# Patient Record
Sex: FEMALE | Race: WHITE | NOT HISPANIC OR LATINO
[De-identification: names, ages, dates, MRNs, and addresses within clinical notes are randomized per-mention and may not be internally consistent; named-entity substitution may affect disease eponyms.]

---

## 2019-02-19 PROBLEM — Z00.00 ENCOUNTER FOR PREVENTIVE HEALTH EXAMINATION: Status: ACTIVE | Noted: 2019-02-19

## 2019-02-25 ENCOUNTER — APPOINTMENT (OUTPATIENT)
Dept: GYNECOLOGIC ONCOLOGY | Facility: CLINIC | Age: 59
End: 2019-02-25

## 2019-04-05 ENCOUNTER — APPOINTMENT (OUTPATIENT)
Dept: GYNECOLOGIC ONCOLOGY | Facility: CLINIC | Age: 59
End: 2019-04-05
Payer: COMMERCIAL

## 2019-04-05 VITALS
DIASTOLIC BLOOD PRESSURE: 93 MMHG | BODY MASS INDEX: 22.58 KG/M2 | HEIGHT: 60 IN | WEIGHT: 115 LBS | HEART RATE: 104 BPM | SYSTOLIC BLOOD PRESSURE: 159 MMHG | TEMPERATURE: 99.3 F | OXYGEN SATURATION: 98 %

## 2019-04-05 DIAGNOSIS — F17.200 NICOTINE DEPENDENCE, UNSPECIFIED, UNCOMPLICATED: ICD-10-CM

## 2019-04-05 DIAGNOSIS — G45.9 TRANSIENT CEREBRAL ISCHEMIC ATTACK, UNSPECIFIED: ICD-10-CM

## 2019-04-05 DIAGNOSIS — Z80.3 FAMILY HISTORY OF MALIGNANT NEOPLASM OF BREAST: ICD-10-CM

## 2019-04-05 DIAGNOSIS — Z78.9 OTHER SPECIFIED HEALTH STATUS: ICD-10-CM

## 2019-04-05 DIAGNOSIS — Z86.39 PERSONAL HISTORY OF OTHER ENDOCRINE, NUTRITIONAL AND METABOLIC DISEASE: ICD-10-CM

## 2019-04-05 DIAGNOSIS — Z82.49 FAMILY HISTORY OF ISCHEMIC HEART DISEASE AND OTHER DISEASES OF THE CIRCULATORY SYSTEM: ICD-10-CM

## 2019-04-05 PROCEDURE — 76857 US EXAM PELVIC LIMITED: CPT | Mod: 59

## 2019-04-05 PROCEDURE — 76830 TRANSVAGINAL US NON-OB: CPT | Mod: 59

## 2019-04-05 PROCEDURE — 99245 OFF/OP CONSLTJ NEW/EST HI 55: CPT | Mod: 25

## 2019-04-05 PROCEDURE — 93976 VASCULAR STUDY: CPT | Mod: 59

## 2019-04-05 NOTE — CHIEF COMPLAINT
[FreeTextEntry1] : [facility]\par \par [facility] [phone] at [address] [place] [id number]\par

## 2019-04-05 NOTE — ASSESSMENT
[FreeTextEntry1] : Discussed with patient that I am not overly concerned this cyst is malignant. Given the normal NOEMY, I feel conservative management is best. She will return here in 2 months for another pelvic sonogram. She knows to return sooner if she has worsening pelvic pains. \par \par In a study published in November 2018 in [facility], Cat et al. reviewed over 70,000 pelvic ultrasounds in a large unselected population to assess for the risk of ovarian cancer when an ovarian cyst was identified.  In their study, simple cysts were not associated with an increased risk of ovarian cancer.  The authors stated that surveillance of simple cysts likely occurs based on the risk of possibly missing complex features.  However, their data did not support this concern particularly if the cyst was less than 7cm.  They concluded that simple cysts are frequently encountered incidental and normal findings on pelvic imaging and additional evaluation of these findings is not warranted.  I’ve discussed this study with the patient and its strengths and limitations.  We discussed the options of observation vs. no further imaging.\par

## 2019-04-05 NOTE — HISTORY OF PRESENT ILLNESS
[FreeTextEntry1] : This 59yo nulligravida LMP at 42 with history of RSO had recent pelvic cramping which prompted gyn evaluation with Dr. Hernandez. MRI pelvis on 2/12/19 showed a multilocular cystic enlargement of the left ovary with thin enhancing capsule and septations measuring 5.0 x 4.3 x 3.4cm, no solid or nodular mural or septal thickening. Findings favor a benign cystic neoplasm. Normal NOEMY on 2/1/19. Denies vaginal bleeding. Patient's mother had breast cancer at 39yo. Pt nor mother had genetic testing. \par \par Pap smear-10/2018-wnl \par Mammogram-2019\par Colonoscopy-never\par Bone density-2018-wnl\par

## 2019-04-05 NOTE — END OF VISIT
[FreeTextEntry3] : Written by Lili RICO, acting as a scribe for Dr. Migue Lopez.\par This note accurately reflects the work and decisions made by me.\par

## 2019-04-05 NOTE — PHYSICAL EXAM
[Normal] : Bimanual Exam: Normal [de-identified] : Patient was interviewed and examined with chaperone present. Name of chaperone: Lili Ramirez

## 2019-06-07 ENCOUNTER — APPOINTMENT (OUTPATIENT)
Dept: GYNECOLOGIC ONCOLOGY | Facility: CLINIC | Age: 59
End: 2019-06-07

## 2020-02-07 ENCOUNTER — APPOINTMENT (OUTPATIENT)
Dept: GYNECOLOGIC ONCOLOGY | Facility: CLINIC | Age: 60
End: 2020-02-07
Payer: COMMERCIAL

## 2020-02-07 VITALS
DIASTOLIC BLOOD PRESSURE: 98 MMHG | BODY MASS INDEX: 22.58 KG/M2 | WEIGHT: 115 LBS | HEART RATE: 94 BPM | SYSTOLIC BLOOD PRESSURE: 153 MMHG | OXYGEN SATURATION: 96 % | HEIGHT: 60 IN | RESPIRATION RATE: 16 BRPM

## 2020-02-07 DIAGNOSIS — N83.209 UNSPECIFIED OVARIAN CYST, UNSPECIFIED SIDE: ICD-10-CM

## 2020-02-07 PROCEDURE — 99214 OFFICE O/P EST MOD 30 MIN: CPT | Mod: 25

## 2020-02-07 PROCEDURE — 76857 US EXAM PELVIC LIMITED: CPT | Mod: 59

## 2020-02-07 PROCEDURE — 76830 TRANSVAGINAL US NON-OB: CPT | Mod: 59

## 2020-02-07 NOTE — REASON FOR VISIT
[FreeTextEntry1] : [facility] [place]\par \par [facility] [phone] at [address] [place] [id number]\par

## 2020-02-07 NOTE — PHYSICAL EXAM
[Normal] : No focal neurologic defects observed [de-identified] : Tarsha Sanchez MA was present the entire time of results and discussion

## 2020-02-07 NOTE — END OF VISIT
[FreeTextEntry3] : Written by Tarsha Sanchez, acting as a scribe for Dr. Migue Lopez.\par This note accurately reflects the work and decisions made by me\par
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
## 2020-02-07 NOTE — ASSESSMENT
[FreeTextEntry1] : Ultrasound from today a left adnexal septated cyst with debris measuring 5.3x1.8x4.3 cm (slightly increased). Fluid within the endometrial cavity. \par \par I discussed with patient that in the absence of any gynecological symptoms and given the extent of time between now and her last appointment it is unlikely that her findings represent a malignancy. Patient understands that without surgical intervention there is no way to definitively prove that. We discussed repeating an ultrasound in six months. If there continues to be slight growth we may consider surgical intervention.

## 2020-03-16 ENCOUNTER — INPATIENT (INPATIENT)
Facility: HOSPITAL | Age: 60
LOS: 1 days | Discharge: HOME | DRG: 247 | End: 2020-03-18
Attending: INTERNAL MEDICINE | Admitting: STUDENT IN AN ORGANIZED HEALTH CARE EDUCATION/TRAINING PROGRAM
Payer: COMMERCIAL

## 2020-03-16 VITALS
HEIGHT: 60 IN | SYSTOLIC BLOOD PRESSURE: 162 MMHG | TEMPERATURE: 98 F | WEIGHT: 119.93 LBS | DIASTOLIC BLOOD PRESSURE: 83 MMHG | OXYGEN SATURATION: 99 % | HEART RATE: 100 BPM | RESPIRATION RATE: 16 BRPM

## 2020-03-16 DIAGNOSIS — I63.89 OTHER CEREBRAL INFARCTION: ICD-10-CM

## 2020-03-16 DIAGNOSIS — I20.8 OTHER FORMS OF ANGINA PECTORIS: ICD-10-CM

## 2020-03-16 DIAGNOSIS — R07.9 CHEST PAIN, UNSPECIFIED: ICD-10-CM

## 2020-03-16 LAB
ALBUMIN SERPL ELPH-MCNC: 4.5 G/DL — SIGNIFICANT CHANGE UP (ref 3.3–5.2)
ALP SERPL-CCNC: 80 U/L — SIGNIFICANT CHANGE UP (ref 40–120)
ALT FLD-CCNC: 10 U/L — SIGNIFICANT CHANGE UP
ANION GAP SERPL CALC-SCNC: 14 MMOL/L — SIGNIFICANT CHANGE UP (ref 5–17)
APPEARANCE UR: CLEAR — SIGNIFICANT CHANGE UP
AST SERPL-CCNC: 16 U/L — SIGNIFICANT CHANGE UP
BACTERIA # UR AUTO: NEGATIVE — SIGNIFICANT CHANGE UP
BILIRUB SERPL-MCNC: 0.3 MG/DL — LOW (ref 0.4–2)
BILIRUB UR-MCNC: NEGATIVE — SIGNIFICANT CHANGE UP
BUN SERPL-MCNC: 14 MG/DL — SIGNIFICANT CHANGE UP (ref 8–20)
CALCIUM SERPL-MCNC: 9.6 MG/DL — SIGNIFICANT CHANGE UP (ref 8.6–10.2)
CHLORIDE SERPL-SCNC: 101 MMOL/L — SIGNIFICANT CHANGE UP (ref 98–107)
CK SERPL-CCNC: 47 U/L — SIGNIFICANT CHANGE UP (ref 25–170)
CO2 SERPL-SCNC: 25 MMOL/L — SIGNIFICANT CHANGE UP (ref 22–29)
COLOR SPEC: YELLOW — SIGNIFICANT CHANGE UP
CREAT SERPL-MCNC: 0.56 MG/DL — SIGNIFICANT CHANGE UP (ref 0.5–1.3)
D DIMER BLD IA.RAPID-MCNC: <150 NG/ML DDU — SIGNIFICANT CHANGE UP
DIFF PNL FLD: NEGATIVE — SIGNIFICANT CHANGE UP
EPI CELLS # UR: SIGNIFICANT CHANGE UP
GLUCOSE SERPL-MCNC: 100 MG/DL — HIGH (ref 70–99)
GLUCOSE UR QL: NEGATIVE MG/DL — SIGNIFICANT CHANGE UP
HCT VFR BLD CALC: 44.2 % — SIGNIFICANT CHANGE UP (ref 34.5–45)
HGB BLD-MCNC: 14.6 G/DL — SIGNIFICANT CHANGE UP (ref 11.5–15.5)
KETONES UR-MCNC: NEGATIVE — SIGNIFICANT CHANGE UP
LEUKOCYTE ESTERASE UR-ACNC: ABNORMAL
LIDOCAIN IGE QN: 35 U/L — SIGNIFICANT CHANGE UP (ref 22–51)
MAGNESIUM SERPL-MCNC: 1.7 MG/DL — SIGNIFICANT CHANGE UP (ref 1.6–2.6)
MCHC RBC-ENTMCNC: 31.3 PG — SIGNIFICANT CHANGE UP (ref 27–34)
MCHC RBC-ENTMCNC: 33 GM/DL — SIGNIFICANT CHANGE UP (ref 32–36)
MCV RBC AUTO: 94.8 FL — SIGNIFICANT CHANGE UP (ref 80–100)
NITRITE UR-MCNC: NEGATIVE — SIGNIFICANT CHANGE UP
NT-PROBNP SERPL-SCNC: 236 PG/ML — SIGNIFICANT CHANGE UP (ref 0–300)
PH UR: 6 — SIGNIFICANT CHANGE UP (ref 5–8)
PLATELET # BLD AUTO: 357 K/UL — SIGNIFICANT CHANGE UP (ref 150–400)
POTASSIUM SERPL-MCNC: 4 MMOL/L — SIGNIFICANT CHANGE UP (ref 3.5–5.3)
POTASSIUM SERPL-SCNC: 4 MMOL/L — SIGNIFICANT CHANGE UP (ref 3.5–5.3)
PROT SERPL-MCNC: 7 G/DL — SIGNIFICANT CHANGE UP (ref 6.6–8.7)
PROT UR-MCNC: NEGATIVE MG/DL — SIGNIFICANT CHANGE UP
RBC # BLD: 4.66 M/UL — SIGNIFICANT CHANGE UP (ref 3.8–5.2)
RBC # FLD: 13.4 % — SIGNIFICANT CHANGE UP (ref 10.3–14.5)
RBC CASTS # UR COMP ASSIST: NEGATIVE /HPF — SIGNIFICANT CHANGE UP (ref 0–4)
SODIUM SERPL-SCNC: 140 MMOL/L — SIGNIFICANT CHANGE UP (ref 135–145)
SP GR SPEC: 1.01 — SIGNIFICANT CHANGE UP (ref 1.01–1.02)
TROPONIN T SERPL-MCNC: <0.01 NG/ML — SIGNIFICANT CHANGE UP (ref 0–0.06)
UROBILINOGEN FLD QL: NEGATIVE MG/DL — SIGNIFICANT CHANGE UP
WBC # BLD: 8.27 K/UL — SIGNIFICANT CHANGE UP (ref 3.8–10.5)
WBC # FLD AUTO: 8.27 K/UL — SIGNIFICANT CHANGE UP (ref 3.8–10.5)
WBC UR QL: SIGNIFICANT CHANGE UP

## 2020-03-16 PROCEDURE — 93010 ELECTROCARDIOGRAM REPORT: CPT | Mod: 76

## 2020-03-16 PROCEDURE — 71045 X-RAY EXAM CHEST 1 VIEW: CPT | Mod: 26

## 2020-03-16 PROCEDURE — 75571 CT HRT W/O DYE W/CA TEST: CPT | Mod: 26

## 2020-03-16 PROCEDURE — 99220: CPT

## 2020-03-16 PROCEDURE — 70450 CT HEAD/BRAIN W/O DYE: CPT | Mod: 26

## 2020-03-16 PROCEDURE — 70551 MRI BRAIN STEM W/O DYE: CPT | Mod: 26

## 2020-03-16 PROCEDURE — 99223 1ST HOSP IP/OBS HIGH 75: CPT

## 2020-03-16 RX ORDER — SODIUM CHLORIDE 9 MG/ML
1000 INJECTION, SOLUTION INTRAVENOUS
Refills: 0 | Status: DISCONTINUED | OUTPATIENT
Start: 2020-03-16 | End: 2020-03-17

## 2020-03-16 RX ORDER — METOPROLOL TARTRATE 50 MG
12.5 TABLET ORAL
Refills: 0 | Status: DISCONTINUED | OUTPATIENT
Start: 2020-03-16 | End: 2020-03-18

## 2020-03-16 RX ORDER — NITROGLYCERIN 6.5 MG
1 CAPSULE, EXTENDED RELEASE ORAL ONCE
Refills: 0 | Status: COMPLETED | OUTPATIENT
Start: 2020-03-16 | End: 2020-03-16

## 2020-03-16 RX ORDER — ASPIRIN/CALCIUM CARB/MAGNESIUM 324 MG
325 TABLET ORAL DAILY
Refills: 0 | Status: DISCONTINUED | OUTPATIENT
Start: 2020-03-16 | End: 2020-03-17

## 2020-03-16 RX ORDER — METOPROLOL TARTRATE 50 MG
50 TABLET ORAL ONCE
Refills: 0 | Status: DISCONTINUED | OUTPATIENT
Start: 2020-03-16 | End: 2020-03-16

## 2020-03-16 RX ORDER — ASPIRIN/CALCIUM CARB/MAGNESIUM 324 MG
325 TABLET ORAL ONCE
Refills: 0 | Status: COMPLETED | OUTPATIENT
Start: 2020-03-16 | End: 2020-03-16

## 2020-03-16 RX ORDER — ONDANSETRON 8 MG/1
4 TABLET, FILM COATED ORAL EVERY 6 HOURS
Refills: 0 | Status: DISCONTINUED | OUTPATIENT
Start: 2020-03-16 | End: 2020-03-18

## 2020-03-16 RX ORDER — NITROGLYCERIN 6.5 MG
0.4 CAPSULE, EXTENDED RELEASE ORAL
Refills: 0 | Status: DISCONTINUED | OUTPATIENT
Start: 2020-03-16 | End: 2020-03-17

## 2020-03-16 RX ORDER — SODIUM CHLORIDE 9 MG/ML
3 INJECTION INTRAMUSCULAR; INTRAVENOUS; SUBCUTANEOUS EVERY 8 HOURS
Refills: 0 | Status: DISCONTINUED | OUTPATIENT
Start: 2020-03-16 | End: 2020-03-18

## 2020-03-16 RX ORDER — ATORVASTATIN CALCIUM 80 MG/1
20 TABLET, FILM COATED ORAL AT BEDTIME
Refills: 0 | Status: DISCONTINUED | OUTPATIENT
Start: 2020-03-16 | End: 2020-03-17

## 2020-03-16 RX ORDER — NICOTINE POLACRILEX 2 MG
1 GUM BUCCAL DAILY
Refills: 0 | Status: DISCONTINUED | OUTPATIENT
Start: 2020-03-16 | End: 2020-03-18

## 2020-03-16 RX ADMIN — SODIUM CHLORIDE 100 MILLILITER(S): 9 INJECTION, SOLUTION INTRAVENOUS at 22:07

## 2020-03-16 RX ADMIN — ATORVASTATIN CALCIUM 20 MILLIGRAM(S): 80 TABLET, FILM COATED ORAL at 22:03

## 2020-03-16 RX ADMIN — Medication 1 INCH(S): at 16:08

## 2020-03-16 RX ADMIN — Medication 1 INCH(S): at 09:55

## 2020-03-16 RX ADMIN — Medication 1 MILLIGRAM(S): at 18:00

## 2020-03-16 RX ADMIN — Medication 1 PATCH: at 18:09

## 2020-03-16 RX ADMIN — Medication 1 PATCH: at 16:04

## 2020-03-16 RX ADMIN — SODIUM CHLORIDE 3 MILLILITER(S): 9 INJECTION INTRAMUSCULAR; INTRAVENOUS; SUBCUTANEOUS at 21:59

## 2020-03-16 NOTE — ED STATDOCS - PROGRESS NOTE DETAILS
58 y/o F pt with hx of TIA (right sided weakness &numbness lasting 45mins) presents to ED c/o worsening intermittent chest tightness and b/l arm weakness associated with intermittent nausea since Thursday. She went to bed with symptoms and woke up with same symptoms. This morning pt states breaking out in a sweat. Family hx of CAD (father at age 39). Smoker. Allergies to iodine.   No PMD

## 2020-03-16 NOTE — H&P ADULT - ASSESSMENT
60 y/o female with high risk CP, pos. Cardiac CT and pos. Stress echo, old left basal ganglia lacunar CVA, active smoker

## 2020-03-16 NOTE — H&P ADULT - HISTORY OF PRESENT ILLNESS
58 y/o female with history of TIA, active smoker presented earlier today with SSCP that has been on and off since this past Thursday. She denies any known cardiac history and has normal exercise tolerance. Pain radiates to left side and at times is epigastric. No diaphoresis, N/V, SOB with pain. No fever, chills, or pleurisy. She was seen by Cardiology in ED after initial w/u non-diagnostic. She was placed in Obs for ischemic w/u and had cardiac CT which showed increased calcium score in RCA/LAD, then had stress echo which was positive. She will require admission now for left hear cath to further evaluate extent of CAD and need intervention. Currently CP free with stable vitals.

## 2020-03-16 NOTE — ED ADULT NURSE REASSESSMENT NOTE - NS ED NURSE REASSESS COMMENT FT1
pt handed off to TABBY Trevizo in stable condition. Pt oriented to unit, plan of care explained. RN reeducated pt on call bell system. no apparent distress noted at this time.

## 2020-03-16 NOTE — ED CDU PROVIDER INITIAL DAY NOTE - ATTENDING CONTRIBUTION TO CARE
Jerad: I performed a face to face bedside interview with patient regarding history of present illness, review of symptoms and past medical history. I completed an independent physical exam.  I have discussed patient's plan of care with advanced care provider.   I agree with note as stated above including HISTORY OF PRESENT ILLNESS, HIV, PAST MEDICAL/SURGICAL/FAMILY/SOCIAL HISTORY, ALLERGIES AND HOME MEDICATIONS, REVIEW OF SYSTEMS, PHYSICAL EXAM, MEDICAL DECISION MAKING and any PROGRESS NOTES during the time I functioned as the attending physician for this patient  unless otherwise noted. My brief assessment is as follows: 59F h/o TIA p/w CP and b/l arm weakness x 2 days. +family hstory of CAD. Plan for serial trop, cardiac CT, head CT/MRI, cards consult.

## 2020-03-16 NOTE — ED ADULT TRIAGE NOTE - CHIEF COMPLAINT QUOTE
Pt states she has had 2 days of chest pressure that is worse when lying flat. Pt also states she feels like her b/l UE's are weaker than usual. Pt with no focal deficits.

## 2020-03-16 NOTE — CONSULT NOTE ADULT - ASSESSMENT
A/P: 58 y/o F active smoker with a PMHx of TIA (on ASA 325mg PO daily) who presented to the ED with chest pain since Thursday. Patient states that on Thursday morning she awoke with some chest pain across her chest like a band, with associated bilateral arm ache, and neck ache. Patient states that the pain initially came and went throughout the weekend, non-exertional, and worse with laying down on her left side. Patient states that then this morning she woke up at 5 AM with the pain more severe, 7/10, and this time the pain was constant. Patient also noted some burning in her stomach and some nausea. Patient states that the pain has continued throughout her ED stay, improved with NTG, but still there. Patient denies fevers, chills, SOB, orthopnea, PND, LE edema, abdominal pain, N/V/D, headache, or dizziness.   Troponin neg x 1    1. Chest Pain  - Troponin neg x 1  - Continue to trend troponin and CK  - NPO   - Stress echo today  - Check CT calcium score today   - Further workup pending.     2. TIA  - Continue aspirin    3. Tobacco Abuse  Smoking cessation counselling addressed. Patient not motivated. Offered patches and/or meds.

## 2020-03-16 NOTE — ED CDU PROVIDER INITIAL DAY NOTE - PROGRESS NOTE DETAILS
spoke to MD Gannon for echo review, had MRI done pending official results, serial troponin x 2 negative and EKG shows non-ischemic changes, cardiology consult reviewed. MRI shows Chronic lacunar infarction within the left basal ganglia.  Multiple nonspecific abnormal white matter foci of T2/FLAIR prolongation statistically favoring microvascular disease case signed out to JACKY MCGHEE

## 2020-03-16 NOTE — ED CDU PROVIDER INITIAL DAY NOTE - NS ED ROS FT
No fever/chills, No photophobia/eye pain/changes in vision, No ear pain/sore throat/dysphagia, +chest pain/palpitations, no SOB/cough/wheeze/stridor, No abdominal pain, No N/V/D, no dysuria/frequency/discharge, No neck/back pain, no rash, +b/l arm weakness.

## 2020-03-16 NOTE — ED ADULT NURSE NOTE - OBJECTIVE STATEMENT
Assumed care at 0940 pt in no apparent distress co Nausea, chest tightness with numbness to both arms that started on Thursday pain was coming and going but since last night the pain is now constant. pt has family hx of dad having a MI at 38. pt denies any SOB. pt also had a TIA 5 years ago causing her to have right side eye droop

## 2020-03-16 NOTE — H&P ADULT - PROBLEM SELECTOR PLAN 1
Admit to tele, NPO after MN for LHC, cont. ASA, add statin, low dose BB, prn nitro. check A1c, FLP. Counseled on smoking cessation. VC boots/OOB/Ambulate

## 2020-03-16 NOTE — ED CDU PROVIDER DISPOSITION NOTE - ATTENDING CONTRIBUTION TO CARE
I personally saw the patient with the PA, and completed the key components of the history and physical exam. I then discussed the management plan with the PA.    Pt to be admitted for cath after stress/ echo

## 2020-03-16 NOTE — ED CDU PROVIDER DISPOSITION NOTE - CLINICAL COURSE
PT with intermittent CP found to have abnormal stress echo, pt revaluated bty cards that wants to have pt admitted for cath, PT case given to hospitalist team who agrees to admitted for further evaluation and possible surgical intervention, pt made aware of need for admission and possible further treatments, pt states that they agree with need for admission and they understand all results and possible treatments. PT will be admitted to hospitalist team and care will be transferred to admitting team.

## 2020-03-16 NOTE — CONSULT NOTE ADULT - ATTENDING COMMENTS
chest pain recleived with Nitrates.  no ishcemic on ECG and negative cardiac enzymes.  Calcium scor eand stress echo.  if no ischemia, then discharge with out patient follow up.

## 2020-03-16 NOTE — ED PROVIDER NOTE - OBJECTIVE STATEMENT
The patient is a 59 year old female presents with chest pain described as tightness  Smoker  FH of CAD

## 2020-03-16 NOTE — ED CDU PROVIDER INITIAL DAY NOTE - MEDICAL DECISION MAKING DETAILS
chest pain with b/l arm weakness x 2 days: troponin x 2, rpt EKG, cardiac CT, h/o TIA, will check head CT/MRI, re-assess

## 2020-03-16 NOTE — ED CDU PROVIDER INITIAL DAY NOTE - FAMILY HISTORY
Father  Still living? Yes, Estimated age: Age Unknown  Family history of acute anterior wall myocardial infarction, Age at diagnosis: Age Unknown

## 2020-03-16 NOTE — ED ADULT NURSE REASSESSMENT NOTE - NS ED NURSE REASSESS COMMENT FT1
assumed care of pt @ 1930, report received from Johnathon MCNAIR, charting as noted. pt AOx4 in NAD, Vital Signs Stable. pt admits to mild intermittent chest pressure. pt denies need for medication. third trop collected and sent to lab. EKG completed and given to JACKY Roth for review. cards MD at bedside, per MD pt to go for cardiac cath in AM. pt educated on NPO diet after midnight. pt agreeable with plan. awaiting admission dispo.

## 2020-03-16 NOTE — CONSULT NOTE ADULT - SUBJECTIVE AND OBJECTIVE BOX
[facility]                                                               Office:  [address] [place] [address]                                                              Telephone: [phone]. Fax:[phone]                                                                        CARDIOLOGY CONSULTATION NOTE                                                                                             Consult requested by:  Dr. Mars  Reason for Consultation: Chest Pain  History obtained by: Patient and medical record   obtained: No    Chief complaint:    Patient is a 59y old  Female who presents with a chief complaint of chest pain.      HPI: 58 y/o F active smoker with a PMHx of TIA (on ASA 325mg PO daily) who presented to the ED with chest pain since Thursday. Patient states that on Thursday morning she awoke with some chest pain across her chest like a band, with associated bilateral arm ache, and neck ache. Patient states that the pain initially came and went throughout the weekend, non-exertional, and worse with laying down on her left side. Patient states that then this morning she woke up at 5 AM with the pain more severe, 7/10, and this time the pain was constant. Patient also noted some burning in her stomach and some nausea. Patient states that the pain has continued throughout her ED stay, improved with NTG, but still there. Patient denies fevers, chills, SOB, orthopnea, PND, LE edema, abdominal pain, N/V/D, headache, or dizziness.     REVIEW OF SYMPTOMS:     CONSTITUTIONAL: No fever, weight loss, or fatigue  ENMT:  No difficulty hearing, tinnitus, vertigo; No sinus or throat pain  NECK: No pain or stiffness  CARDIOVASCULAR: AS PER HPI  RESPIRATORY: AS PER HPI  : No dysuria, no hematuria   GI: No dark color stool, no melena, no diarrhea, no constipation, no abdominal pain   NEURO: No headache, no dizziness, no slurred speech   MUSCULOSKELETAL: No joint pain or swelling; No muscle, back, or extremity pain  PSYCH: No agitation, no anxiety.    ALL OTHER REVIEW OF SYSTEMS ARE NEGATIVE.      PREVIOUS DIAGNOSTIC TESTING  STRESS FINDINGS: Pending    ALLERGIES: Allergies    No Known Allergies    Intolerances      PAST MEDICAL HISTORY  TIA (transient ischemic attack)      PAST SURGICAL HISTORY      FAMILY HISTORY:  Family history of acute anterior wall myocardial infarction (Father)  Father- MI at 36, s/p CABG     SOCIAL HISTORY:    CIGARETTES:   Active smoker, 1 PPD  ALCOHOL: Socially  DRUGS: Denies    CURRENT MEDICATIONS:     aspirin  nicotine - 21 mG/24Hr(s) Patch    HOME MEDICATIONS:  Home Medications:  aspirin 325 mg oral tablet: 1 tab(s) orally once a day (16 Mar 2020 09:45)      Vital Signs Last 24 Hrs  T(C): 37.1 (16 Mar 2020 11:34), Max: 37.1 (16 Mar 2020 11:34)  T(F): 98.7 (16 Mar 2020 11:34), Max: 98.7 (16 Mar 2020 11:34)  HR: 69 (16 Mar 2020 11:34) (69 - 100)  BP: 117/83 (16 Mar 2020 11:34) (117/83 - 162/83)  RR: 18 (16 Mar 2020 11:34) (16 - 18)  SpO2: 100% (16 Mar 2020 11:34) (99% - 100%)      PHYSICAL EXAM:  Constitutional: Comfortable . No acute distress.   HEENT: Atraumatic and normocephalic , neck is supple . no JVD. No carotid bruit. PEERL   CNS: A&Ox3. No focal deficits. EOMI. Cranial nerves II-IX are intact.   Lymph Nodes: Cervical : Not palpable.  Respiratory: CTAB  Cardiovascular: S1S2 RRR. No murmur/rubs or gallop.  Gastrointestinal: Soft non-tender and non distended . +Bowel sounds. negative Myles's sign.  Extremities: No edema.   Psychiatric: Calm . no agitation.  Skin: No skin rash/ulcers visualized to face, hands or feet.    Intake and output:     LABS:                        14.6   8.27  )-----------( 357      ( 16 Mar 2020 09:33 )             44.2     03-16    140  |  101  |  14.0  ----------------------------<  100<H>  4.0   |  25.0  |  0.56    Ca    9.6      16 Mar 2020 09:33  Mg     1.7     03-16    TPro  7.0  /  Alb  4.5  /  TBili  0.3<L>  /  DBili  x   /  AST  16  /  ALT  10  /  AlkPhos  80  03-16    CARDIAC MARKERS ( 16 Mar 2020 09:33 )  x     / <0.01 ng/mL / x     / x     / x        ;p-BNP=Serum Pro-Brain Natriuretic Peptide: 236 pg/mL (- @ 09:33)      Urinalysis Basic - ( 16 Mar 2020 11:26 )    Color: Yellow / Appearance: Clear / S.015 / pH: x  Gluc: x / Ketone: Negative  / Bili: Negative / Urobili: Negative mg/dL   Blood: x / Protein: Negative mg/dL / Nitrite: Negative   Leuk Esterase: Trace / RBC: Negative /HPF / WBC 0-2   Sq Epi: x / Non Sq Epi: Occasional / Bacteria: Negative        INTERPRETATION OF TELEMETRY: Reviewed by me. SR  ECG: Reviewed by me. SR, PRWP,    RADIOLOGY & ADDITIONAL STUDIES:    X-ray:  reviewed by me.   < from: Xray Chest 1 View-PORTABLE IMMEDIATE (20 @ 10:11) >   EXAM:  XR CHEST PORTABLE IMMED 1V                          PROCEDURE DATE:  2020          INTERPRETATION:  CHEST AP PORTABLE:    History: Chest pain.    Date and time of exam: 3/16/2020 10:01 AM.    Comparison: No prior exam.    Technique: [place] AP view of the chest was obtained.    Findings:  The lungs are clear. The heart and mediastinum are unremarkable.  No hilar abnormality is seen.  There is no evidence of pleural effusion. The visualized osseous structures demonstrate no abnormality.    Impression:    Unremarkable exam.    < end of copied text >

## 2020-03-16 NOTE — ED ADULT NURSE REASSESSMENT NOTE - NSIMPLEMENTINTERV_GEN_ALL_ED
Implemented All Universal Safety Interventions:  [place] to call system. Call bell, personal items and telephone within reach. Instruct patient to call for assistance. Room bathroom lighting operational. Non-slip footwear when patient is off stretcher. Physically safe environment: no spills, clutter or unnecessary equipment. Stretcher in lowest position, wheels locked, appropriate side rails in place.

## 2020-03-16 NOTE — ED CDU PROVIDER INITIAL DAY NOTE - OBJECTIVE STATEMENT
This is a 59 year old female with chest pain since Thursday.  She notes initially pain came and go however now more constant.  She notes pain is worse with lying on side, alleviated when sitting up.  She notes pain woke her up from sleep.  She describes it as tightness across her chest.  She notes nitro paste in ED alleviated her symptoms.  She is a current every day smoker 1 cigarette x 1 hour x 20 years.  She notes h/o TIA, on 325mg ASA daily.  She notes family history of MI father MI in 36, currently has had CABG x 3.

## 2020-03-17 ENCOUNTER — TRANSCRIPTION ENCOUNTER (OUTPATIENT)
Age: 60
End: 2020-03-17

## 2020-03-17 LAB
CHOLEST SERPL-MCNC: 203 MG/DL — HIGH (ref 110–199)
HBA1C BLD-MCNC: 5.4 % — SIGNIFICANT CHANGE UP (ref 4–5.6)
HCV AB S/CO SERPL IA: 0.08 S/CO — SIGNIFICANT CHANGE UP (ref 0–0.99)
HCV AB SERPL-IMP: SIGNIFICANT CHANGE UP
HDLC SERPL-MCNC: 72 MG/DL — SIGNIFICANT CHANGE UP
LIPID PNL WITH DIRECT LDL SERPL: 108 MG/DL — SIGNIFICANT CHANGE UP
TOTAL CHOLESTEROL/HDL RATIO MEASUREMENT: 3 RATIO — LOW (ref 3.3–7.1)
TRIGL SERPL-MCNC: 114 MG/DL — SIGNIFICANT CHANGE UP (ref 10–200)

## 2020-03-17 PROCEDURE — 92928 PRQ TCAT PLMT NTRAC ST 1 LES: CPT | Mod: RC

## 2020-03-17 PROCEDURE — 93458 L HRT ARTERY/VENTRICLE ANGIO: CPT | Mod: 26,59

## 2020-03-17 PROCEDURE — 99232 SBSQ HOSP IP/OBS MODERATE 35: CPT

## 2020-03-17 PROCEDURE — 99152 MOD SED SAME PHYS/QHP 5/>YRS: CPT

## 2020-03-17 PROCEDURE — 99233 SBSQ HOSP IP/OBS HIGH 50: CPT

## 2020-03-17 PROCEDURE — 93010 ELECTROCARDIOGRAM REPORT: CPT

## 2020-03-17 RX ORDER — METOPROLOL TARTRATE 50 MG
0.5 TABLET ORAL
Qty: 30 | Refills: 0
Start: 2020-03-17 | End: 2020-04-15

## 2020-03-17 RX ORDER — DIPHENHYDRAMINE HCL 50 MG
25 CAPSULE ORAL ONCE
Refills: 0 | Status: COMPLETED | OUTPATIENT
Start: 2020-03-17 | End: 2020-03-17

## 2020-03-17 RX ORDER — NITROGLYCERIN 6.5 MG
1 CAPSULE, EXTENDED RELEASE ORAL ONCE
Refills: 0 | Status: DISCONTINUED | OUTPATIENT
Start: 2020-03-17 | End: 2020-03-17

## 2020-03-17 RX ORDER — ATORVASTATIN CALCIUM 80 MG/1
1 TABLET, FILM COATED ORAL
Qty: 30 | Refills: 0
Start: 2020-03-17 | End: 2020-04-15

## 2020-03-17 RX ORDER — ASPIRIN/CALCIUM CARB/MAGNESIUM 324 MG
1 TABLET ORAL
Qty: 0 | Refills: 0 | DISCHARGE

## 2020-03-17 RX ORDER — NICOTINE POLACRILEX 2 MG
1 GUM BUCCAL
Qty: 14 | Refills: 0
Start: 2020-03-17 | End: 2020-03-30

## 2020-03-17 RX ORDER — FAMOTIDINE 10 MG/ML
20 INJECTION INTRAVENOUS ONCE
Refills: 0 | Status: COMPLETED | OUTPATIENT
Start: 2020-03-17 | End: 2020-03-17

## 2020-03-17 RX ORDER — ASPIRIN/CALCIUM CARB/MAGNESIUM 324 MG
1 TABLET ORAL
Qty: 30 | Refills: 0
Start: 2020-03-17 | End: 2020-04-15

## 2020-03-17 RX ORDER — ASPIRIN/CALCIUM CARB/MAGNESIUM 324 MG
81 TABLET ORAL DAILY
Refills: 0 | Status: DISCONTINUED | OUTPATIENT
Start: 2020-03-18 | End: 2020-03-18

## 2020-03-17 RX ORDER — TICAGRELOR 90 MG/1
90 TABLET ORAL EVERY 12 HOURS
Refills: 0 | Status: DISCONTINUED | OUTPATIENT
Start: 2020-03-17 | End: 2020-03-18

## 2020-03-17 RX ORDER — TICAGRELOR 90 MG/1
1 TABLET ORAL
Qty: 60 | Refills: 0
Start: 2020-03-17 | End: 2020-04-15

## 2020-03-17 RX ORDER — ATORVASTATIN CALCIUM 80 MG/1
40 TABLET, FILM COATED ORAL AT BEDTIME
Refills: 0 | Status: DISCONTINUED | OUTPATIENT
Start: 2020-03-17 | End: 2020-03-18

## 2020-03-17 RX ORDER — KETOROLAC TROMETHAMINE 30 MG/ML
30 SYRINGE (ML) INJECTION ONCE
Refills: 0 | Status: DISCONTINUED | OUTPATIENT
Start: 2020-03-17 | End: 2020-03-17

## 2020-03-17 RX ADMIN — SODIUM CHLORIDE 3 MILLILITER(S): 9 INJECTION INTRAMUSCULAR; INTRAVENOUS; SUBCUTANEOUS at 22:02

## 2020-03-17 RX ADMIN — SODIUM CHLORIDE 3 MILLILITER(S): 9 INJECTION INTRAMUSCULAR; INTRAVENOUS; SUBCUTANEOUS at 05:39

## 2020-03-17 RX ADMIN — TICAGRELOR 90 MILLIGRAM(S): 90 TABLET ORAL at 22:03

## 2020-03-17 RX ADMIN — Medication 12.5 MILLIGRAM(S): at 17:23

## 2020-03-17 RX ADMIN — Medication 12.5 MILLIGRAM(S): at 05:40

## 2020-03-17 RX ADMIN — SODIUM CHLORIDE 3 MILLILITER(S): 9 INJECTION INTRAMUSCULAR; INTRAVENOUS; SUBCUTANEOUS at 13:51

## 2020-03-17 RX ADMIN — Medication 1 INCH(S): at 22:02

## 2020-03-17 RX ADMIN — Medication 1 PATCH: at 15:45

## 2020-03-17 RX ADMIN — Medication 1 PATCH: at 08:04

## 2020-03-17 RX ADMIN — Medication 30 MILLIGRAM(S): at 12:41

## 2020-03-17 RX ADMIN — Medication 100 MILLIGRAM(S): at 10:32

## 2020-03-17 RX ADMIN — Medication 325 MILLIGRAM(S): at 08:46

## 2020-03-17 RX ADMIN — FAMOTIDINE 20 MILLIGRAM(S): 10 INJECTION INTRAVENOUS at 10:31

## 2020-03-17 RX ADMIN — Medication 1 PATCH: at 19:21

## 2020-03-17 RX ADMIN — Medication 25 MILLIGRAM(S): at 10:32

## 2020-03-17 RX ADMIN — Medication 1 INCH(S): at 10:29

## 2020-03-17 RX ADMIN — ATORVASTATIN CALCIUM 40 MILLIGRAM(S): 80 TABLET, FILM COATED ORAL at 22:04

## 2020-03-17 NOTE — DISCHARGE NOTE PROVIDER - NSDCCPCAREPLAN_GEN_ALL_CORE_FT
PRINCIPAL DISCHARGE DIAGNOSIS  Diagnosis: Chest pain  Assessment and Plan of Treatment: Continue following a healthy lifestyle, this includes exercising 150minutes a week or as much as tolerated after being cleared by your cardiologist, and eating a healthy balanced diet consisting of fresh fruits and veggies. Be sure to take all medications as prescribed, do not miss them! Follow up with your primary care doctor and/or Cardiologist. If you have chest pain, be sure to come to the ER immediately for an evaluation.      SECONDARY DISCHARGE DIAGNOSES  Diagnosis: Smoking  Assessment and Plan of Treatment: It is very important for your health that you stop smoking! There are many different ways to do so, nicotine patches, gum, and much more! Please discuss your various options with your Primary care doctor. There are also many online resources and hotlines that you can call. Feel free to request more information.    Diagnosis: History of stroke  Assessment and Plan of Treatment: You have a history of stroke meaning that you have had one or more strokes in the past. If you have been prescribed medications in the past or during this admission to help prevent strokes in the future, it is very important that you take them and are compliant with them to help lower your risk of having a recurrent stroke. As you know, strokes can be extremely debilitating, life changing, and at times fatal. If you have been advised to follow up with a neurologist, be sure to do so.

## 2020-03-17 NOTE — DISCHARGE NOTE PROVIDER - NSDCMRMEDTOKEN_GEN_ALL_CORE_FT
aspirin 325 mg oral tablet: 1 tab(s) orally once a day aspirin 81 mg oral tablet, chewable: 1 tab(s) orally once a day  atorvastatin 40 mg oral tablet: 1 tab(s) orally once a day (at bedtime)  Habitrol 21 mg/24 hr transdermal film, extended release: 1 patch transdermal once a day  Metoprolol Tartrate 25 mg oral tablet: 0.5 tab(s) orally 2 times a day   ticagrelor 90 mg oral tablet: 1 tab(s) orally every 12 hours

## 2020-03-17 NOTE — PROGRESS NOTE ADULT - ASSESSMENT
A/P: 58 y/o F active smoker with a PMHx of TIA (on ASA 325mg PO daily) who presented to the ED with chest pain since Thursday. Patient states that on Thursday morning she awoke with some chest pain across her chest like a band, with associated bilateral arm ache, and neck ache. Patient states that the pain initially came and went throughout the weekend, non-exertional, and worse with laying down on her left side. Patient states that then this morning she woke up at 5 AM with the pain more severe, 7/10, and this time the pain was constant. Patient also noted some burning in her stomach and some nausea. Patient states that the pain has continued throughout her ED stay, improved with NTG, but still there. Patient denies fevers, chills, SOB, orthopnea, PND, LE edema, abdominal pain, N/V/D, headache, or dizziness.   Troponin neg x 2    1. Chest Pain  - Troponin neg x 2, CK negative  - Stress Echo with RWMA in LCX and RCA territory, and elevated calcium score in LAD and RCA  - NPO  - Plan for C today.   - Administer NTG SL for active chest pain.   - Further workup pending cath results.     2. TIA  - Continue aspirin    3. Tobacco Abuse  - Smoking cessation counselling addressed. Patient with nicotine patch in place.     Preliminary evaluation, please await official recommendations by Dr. Valera A/P: 58 y/o F active smoker with a PMHx of TIA (on ASA 325mg PO daily) who presented to the ED with chest pain since Thursday. Patient states that on Thursday morning she awoke with some chest pain across her chest like a band, with associated bilateral arm ache, and neck ache. Patient states that the pain initially came and went throughout the weekend, non-exertional, and worse with laying down on her left side. Patient states that then this morning she woke up at 5 AM with the pain more severe, 7/10, and this time the pain was constant. Patient also noted some burning in her stomach and some nausea. Patient states that the pain has continued throughout her ED stay, improved with NTG, but still there. Patient denies fevers, chills, SOB, orthopnea, PND, LE edema, abdominal pain, N/V/D, headache, or dizziness.   Troponin neg x 2    1. Chest Pain  - Troponin neg x 2, CK negative  - Stress Echo with RWMA in LCX and RCA territory, and elevated calcium score in LAD and RCA  - Patient is now s/p JENNIFER x 1 to RCA   - Continue aspirin and brilinta  - Lipitor increased to 40mg.   - Continue metoprolol 12.5mg PO BID   - Administer NTG SL for active chest pain.     2. TIA  - Continue aspirin and statin.     3. Tobacco Abuse  - Smoking cessation counselling addressed. Patient with nicotine patch in place.     Preliminary evaluation, please await official recommendations by Dr. Valera

## 2020-03-17 NOTE — DISCHARGE NOTE PROVIDER - CARE PROVIDER_API CALL
Luis Arzola)  Cardiovascular Disease  [facility], [address] [id number]  Phone: [phone]  Fax: [phone]  Follow Up Time:

## 2020-03-17 NOTE — PROGRESS NOTE ADULT - ASSESSMENT
60 y/o female with pmh chronic LT basal ganglia lacunar CVA, tobacco use presenting w/ cp. placed on obs, cardiac CT abn, stress echo pos, admitted and taken for [facility] [place].     #CAD sp stent, stable -  pos cardiac CT + stress echo. cardio eval appreciated, sp LHC 3/17 - well tolerated, uncomplicated, sp JENNIFER x1 to RCA. asa. statin. bb. brillinta. a1c 5.4    #chronic LT basal ganglia lacunar CVA w/o residual deficints, stable - us carotid done - pending result. a1c as above. lipid panel WNL - statin as above.     #tobacco use - cessation strongly advisable, outpt fu.     dvt ppx. low risk    dispo. monitor 24hrs, poss dc in am    outpt fu. pmd. cardio

## 2020-03-17 NOTE — PROGRESS NOTE ADULT - SUBJECTIVE AND OBJECTIVE BOX
[place] [facility]                                                               Office: [address] [address]                                                              Telephone: [phone]. Fax:[phone]                                                                             PROGRESS NOTE  Reason for follow up: Chest pain  Overnight: No new events.   Update: Patient still with 7/10 chest pain, states it has been constant since yesterday morning. Troponins negative. Patient with elevated calcium score and abnormal stress test. Plan for [facility] today.     Subjective: "I still have chest pain."    	  Vitals:  T(C): 36.8 (03-17-20 @ 09:48), Max: 37.1 (03-16-20 @ 11:34)  HR: 74 (03-17-20 @ 09:48) (69 - 90)  BP: 133/84 (03-17-20 @ 09:48) (101/67 - 138/88)  RR: 16 (03-17-20 @ 09:48) (16 - 18)  SpO2: 97% (03-17-20 @ 09:48) (95% - 100%)  I&O's Summary    Weight (kg): 54.4 (03-16 @ 08:08)      PHYSICAL EXAM:  Appearance: Comfortable. No acute distress  HEENT:  Head and neck: Atraumatic. Normocephalic.  Normal oral mucosa, PERRL, Neck is supple. No JVD, No carotid bruit.   Neurologic: A & O x 3, no focal deficits. EOMI.  Lymphatic: No cervical lymphadenopathy  Cardiovascular: Normal S1 S2, No murmur, rubs/gallops. No JVD, No edema  Respiratory: Lungs clear to auscultation  Gastrointestinal:  Soft, Non-tender, + BS  Lower Extremities: No edema  Psychiatry: Patient is calm. No agitation. Mood & affect appropriate  Skin: No rashes/ ecchymoses/cyanosis/ulcers visualized on the face, hands or feet.      CURRENT MEDICATIONS:  metoprolol tartrate 12.5 milliGRAM(s) Oral two times a day  nitroglycerin     SubLingual 0.4 milliGRAM(s) SubLingual every 5 minutes PRN    diphenhydrAMINE   Injectable  aspirin  famotidine Injectable  atorvastatin  methylPREDNISolone sodium succinate Injectable  dextrose 5% + sodium chloride 0.9%.  sodium chloride 0.9% lock flush      DIAGNOSTIC TESTING:  [ ]  Catheterization: Pending  [ ] Stress Test:    < from: Stress Echocardiogram (03.16.20 @ 16:56) >  Exam Protocol:The patient exercised on a treadmill according to a Bonilla protocol.       Stress Hemodynamics: The patient exercised for 7 and 30 seconds, achieving 9 METS. The maximum stage achieved was II of the Bonilla protocol. The patient developed atypical chestpain during the stress exam. The symptoms resolved with rest. The patient's functional capacity was average. The resting heart rate was 79. The maximum age predicted heart rate is 162 bpm  The target heart rate is 138 bpm The peak heart rate was 166 bpm. The resting blood pressure was 112/68 mmHg. The peak blood pressure during stress was 150/82 mmHg. The blood pressure response was normal. The double product achieved was 53579. Achieved 102% of maximal predicted heart rate.     Baseline EKG: Resting EKG showed normal sinus rhythm at a rate of 79 beats per minute, with no abnormal findings. The patient developed no abnormal findings during exercise.         LV Wall Scoring:  Stage: All segments are normal.  Rest    Stage: The basal and mid anterolateral wall, basal and mid inferior septum,  Impost basal and mid inferior wall, and basal and mid inferolateral wall are         hypokinetic.      Summary:   1. ABNORMAL STUDY   2. The patient's functional capacity was average. 9 METs. 102% MPHR.   3. No cardiac symptoms. No ischemic ECG changes.      Null treadmill score = 7.   4. Positive exercise stress echo for ischemia suggesting left circumflex and right coronary artery disease.    Sameer Valera MD, RPVI    < end of copied text >    OTHER: 	  < from: CT Heart Calcium Score (03.16.20 @ 15:35) >  Calcium score as described based on the Agatston equivalent.     Segment                                Score  --------------------------------------------------  Left Main                                0  Left anterior Descending      196  Circumflex                              0  Right                                       264  --------------------------------------------------  Total                                      460    Age/Sex Adjusted Percentile Rating (Raggi, Circulation 2000):   >90th percentile. Coronary age: >70 years. Significant calcification.      IMPRESSION:   Total Coronary Artery Calcium Score = 456.  90th percentile. Significant calcification. Significant atherosclerotic burden.  For noncardiac structures, kindly refer to radiology addendum.    < end of copied text >      LABS:	 	  CARDIAC MARKERS ( 16 Mar 2020 19:27 )  x     / <0.01 ng/mL / x     / x     / x      p-BNP 16 Mar 2020 19:27: x    , CARDIAC MARKERS ( 16 Mar 2020 13:07 )  x     / <0.01 ng/mL / 47 U/L / x     / x      p-BNP 16 Mar 2020 13:07: x    , CARDIAC MARKERS ( 16 Mar 2020 09:33 )  x     / <0.01 ng/mL / x     / x     / x      p-BNP 16 Mar 2020 09:33: 236 pg/mL                          14.6   8.27  )-----------( 357      ( 16 Mar 2020 09:33 )             44.2     03-16    140  |  101  |  14.0  ----------------------------<  100<H>  4.0   |  25.0  |  0.56    Ca    9.6      16 Mar 2020 09:33  Mg     1.7     03-16    TPro  7.0  /  Alb  4.5  /  TBili  0.3<L>  /  DBili  x   /  AST  16  /  ALT  10  /  AlkPhos  80  03-16    proBNP: Serum Pro-Brain Natriuretic Peptide: 236 pg/mL (03-16 @ 09:33)    Lipid Profile: Date: 03-17 @ 05:49  Total cholesterol 203; Direct LDL: 108; HDL: 72; Triglycerides:114    HgA1c: Hemoglobin A1C, Whole Blood: 5.4 %    TSH:       TELEMETRY: Reviewed  SR  ECG:  Reviewed by me.

## 2020-03-17 NOTE — PROGRESS NOTE ADULT - SUBJECTIVE AND OBJECTIVE BOX
Nurse Practitioner Progress note:     INTERVAL HISTORY: 59 year old female with chest pain    MEDICATIONS:  metoprolol tartrate 12.5 milliGRAM(s) Oral two times a day  ondansetron Injectable 4 milliGRAM(s) IV Push every 6 hours PRN  atorvastatin 40 milliGRAM(s) Oral at bedtime  dextrose 5% + sodium chloride 0.9%. 1000 milliLiter(s) IV Continuous <Continuous>  sodium chloride 0.9% lock flush 3 milliLiter(s) IV Push every 8 hours  ticagrelor 90 milliGRAM(s) Oral every 12 hours      TELEMETRY: NSR    T(C): 36.8 (03-17-20 @ 09:48), Max: 36.8 (03-16-20 @ 15:56)  HR: 72 (03-17-20 @ 12:25) (70 - 90)  BP: 123/95 (03-17-20 @ 12:25) (101/67 - 138/88)  RR: 15 (03-17-20 @ 12:25) (15 - 18)  SpO2: 96% (03-17-20 @ 12:25) (95% - 99%)  Wt(kg): --    PHYSICAL EXAM:  Appearance: Normal	  Cardiovascular: Normal S1 S2, No JVD, No murmurs, No edema  Respiratory: Lungs clear to auscultation	  Psychiatry: A & O x 3, Mood & affect appropriate  Neurologic: Non-focal, A&O X3.  No neuro deficits  Procedure Site: Right radial band in place.  Site benign.  No bleeding/hematoma/ecchymosis.  + palp radial pusle    12 lead EKG:  	NSR 68 bpm.  No acute changes      PROCEDURE RESULTS: S/P PCI with JENNIFER X1 to RCA    ASSESSMENT/PLAN: 	  -Radial precautions  -D/C radial band 2 hours  -Initiate brilinta, ASA  -increase lipitor to 40 mg  -Follow up with Dr. Arzola  -Check labs/EKG/site check in AM Nurse Practitioner Progress note:     INTERVAL HISTORY: 59 year old female with chest pain    MEDICATIONS:  metoprolol tartrate 12.5 milliGRAM(s) Oral two times a day  ondansetron Injectable 4 milliGRAM(s) IV Push every 6 hours PRN  atorvastatin 40 milliGRAM(s) Oral at bedtime  dextrose 5% + sodium chloride 0.9%. 1000 milliLiter(s) IV Continuous <Continuous>  sodium chloride 0.9% lock flush 3 milliLiter(s) IV Push every 8 hours  ticagrelor 90 milliGRAM(s) Oral every 12 hours      TELEMETRY: NSR    T(C): 36.8 (03-17-20 @ 09:48), Max: 36.8 (03-16-20 @ 15:56)  HR: 72 (03-17-20 @ 12:25) (70 - 90)  BP: 123/95 (03-17-20 @ 12:25) (101/67 - 138/88)  RR: 15 (03-17-20 @ 12:25) (15 - 18)  SpO2: 96% (03-17-20 @ 12:25) (95% - 99%)  Wt(kg): --    PHYSICAL EXAM:  Appearance: Normal	  Cardiovascular: Normal S1 S2, No JVD, No murmurs, No edema  Respiratory: Lungs clear to auscultation	  Psychiatry: A & O x 3, Mood & affect appropriate  Neurologic: Non-focal, A&O X3.  No neuro deficits  Procedure Site: Right radial band in place.  Site benign.  No bleeding/hematoma/ecchymosis.  + palp radial pusle    12 lead EKG:  	NSR 68 bpm.  No acute changes      PROCEDURE RESULTS: S/P PCI with JENNIFER X1 to RCA. Full report to follow    ASSESSMENT/PLAN: 	  -Radial precautions  -D/C radial band 2 hours  -Initiate brilinta, ASA  -increase lipitor to 40 mg  -Follow up with Dr. Jeffers  -Check labs/EKG/site check in AM  -cardiac rehab info provided/referral and communication to cardiac rehab completed  -toradol given for chest discomfort (different from pre procedure), EKG unchanged

## 2020-03-17 NOTE — PROGRESS NOTE ADULT - SUBJECTIVE AND OBJECTIVE BOX
CC: cp    Patient seen and examined at the bedside. No acute overnight events. admitted from obs yesterday. Complaining of mild persistent cp but much improved today. Denies fever/chills, headache, lightheadedness, dizziness,palpitations, shortness of breath, cough, abd pain, nausea/vomiting/diarrhea, muscle pain. seen post cath      =========================================================================================  T(C): 36.5 (20 @ 15:22), Max: 36.8 (20 @ 09:48)  HR: 94 (20 @ 15:22) (61 - 94)  BP: 118/81 (20 @ 15:22) (101/67 - 142/89)  RR: 17 (20 @ 15:22) (15 - 20)  SpO2: 99% (20 @ 15:22) (95% - 100%)    PHYSICAL EXAM.    GEN - appears age appropriate. well nourished. pleasant. no distress.   HEENT - NCAT, EOMI, BIJU  RESP - CTA BL, no wheeze/stridor/rhonchi/crackles. not on supplemental O2. able to speak in full sentences without distress.   CARDIO - NS1S2, RRR. No murmurs/rubs/gallops.  ABD - Soft/Non tender/Non distended. Normal BS x4 quadrants. no guarding/rebound tenderness.  Ext - No MELISSA. RT radial band noted  MSK - BL 5/5 strength on upper and lower extremities.   Neuro - AAOx3. no gross neuro deficits noted  Psych - normal affect  Skin - c/d/i. no rashes/lesions      I&O's Summary    Daily     Daily     =========================================================================================  LABS.    -16    140  |  101  |  14.0  ----------------------------<  100<H>  4.0   |  25.0  |  0.56    Ca    9.6      16 Mar 2020 09:33  Mg     1.7     -    TPro  7.0  /  Alb  4.5  /  TBili  0.3<L>  /  DBili  x   /  AST  16  /  ALT  10  /  AlkPhos  80  16                          14.6   8.27  )-----------( 357      ( 16 Mar 2020 09:33 )             44.2     LIVER FUNCTIONS - ( 16 Mar 2020 09:33 )  Alb: 4.5 g/dL / Pro: 7.0 g/dL / ALK PHOS: 80 U/L / ALT: 10 U/L / AST: 16 U/L / GGT: x             CARDIAC MARKERS ( 16 Mar 2020 19:27 )  x     / <0.01 ng/mL / x     / x     / x      CARDIAC MARKERS ( 16 Mar 2020 13:07 )  x     / <0.01 ng/mL / 47 U/L / x     / x      CARDIAC MARKERS ( 16 Mar 2020 09:33 )  x     / <0.01 ng/mL / x     / x     / x          Urinalysis Basic - ( 16 Mar 2020 11:26 )    Color: Yellow / Appearance: Clear / S.015 / pH: x  Gluc: x / Ketone: Negative  / Bili: Negative / Urobili: Negative mg/dL   Blood: x / Protein: Negative mg/dL / Nitrite: Negative   Leuk Esterase: Trace / RBC: Negative /HPF / WBC 0-2   Sq Epi: x / Non Sq Epi: Occasional / Bacteria: Negative          =========================================================================================  IMAGING.     =========================================================================================    DIET.    Diet, Regular (20 @ 12:16)      =========================================================================================    HOME MEDS.    Home Medications:  aspirin 325 mg oral tablet: 1 tab(s) orally once a day (16 Mar 2020 20:34)      =========================================================================================    HOSPITAL MEDS.    MEDICATIONS  (STANDING):  atorvastatin 40 milliGRAM(s) Oral at bedtime  metoprolol tartrate 12.5 milliGRAM(s) Oral two times a day  nicotine - 21 mG/24Hr(s) Patch 1 patch Transdermal daily  sodium chloride 0.9% lock flush 3 milliLiter(s) IV Push every 8 hours  ticagrelor 90 milliGRAM(s) Oral every 12 hours    MEDICATIONS  (PRN):  ondansetron Injectable 4 milliGRAM(s) IV Push every 6 hours PRN Nausea

## 2020-03-17 NOTE — PROGRESS NOTE ADULT - SUBJECTIVE AND OBJECTIVE BOX
Cardiology Nurse Practitioner Note  Pre procedure    59 year old female with ho TIA, active smoker who c/o chest pain since Thursday.  Negative trops but elevated calcium score.  For C to assess coronary arteries.  Pt currently c/o 7/10 desirae pain.  Nitro paste applied.  EKG done and unchanged.     VSS  Neuro: A&O X3  Lungs: CTA  CV: RRR  Ext: + palp pulses    ASA 2  Mallampati 2    Bleeding risk 2.1%    EKG: NSR 70 bpm    ASA given  cleared for procedure Cardiology Nurse Practitioner Note  Pre procedure    59 year old female with ho TIA, active smoker who c/o chest pain since Thursday.  Negative trops but elevated calcium score.  For [facility] to assess coronary arteries.  Pt currently c/o 7/10 desirae pain.  Nitro paste applied.  EKG done and unchanged.     VSS  Neuro: A&O X3  Lungs: CTA  CV: RRR  Ext: + palp pulses    ASA 2  Mallampati 2    Bleeding risk 2.1%    EKG: NSR 70 bpm    Pt states she has allergy to sulfa and has never had contrast  -pt premedicated    ASA given  cleared for procedure

## 2020-03-17 NOTE — DISCHARGE NOTE PROVIDER - HOSPITAL COURSE
60 y/o female with pmh chronic LT basal ganglia lacunar CVA, tobacco use presenting w/ cp. placed on obs, cardiac CT abn, stress echo pos, admitted and taken for [facility].         #CAD sp stent, stable -  pos cardiac CT + stress echo. cardio eval appreciated, sp LHC 3/17 - well tolerated, uncomplicated, sp JENNIFER x1 to RCA. asa. statin. bb. brillinta. a1c 5.4        #chronic LT basal ganglia lacunar CVA w/o residual deficints, stable - us carotid done. a1c as above. lipid panel WNL - statin as above.         #tobacco use - cessation strongly advisable, outpt fu.         outpt fu. pmd. cardio 60 y/o female with pmh chronic LT basal ganglia lacunar CVA, tobacco use presenting w/ cp. placed on obs, cardiac CT abn, stress echo pos, admitted and taken for [facility].         #CAD sp stent, stable -  pos cardiac CT + stress echo. cardio eval appreciated, sp LHC 3/17 - well tolerated, uncomplicated, sp JENNIFER x1 to RCA. asa. statin. bb. brillinta. a1c 5.4        #chronic LT basal ganglia lacunar CVA w/o residual deficints, stable - us carotid ordered - pt refused. a1c as above. lipid panel WNL - statin as above.         #tobacco use - cessation strongly advisable, outpt fu.         outpt fu. pmd. cardio        Vital Signs Last 24 Hrs    T(C): 36.6 (18 Mar 2020 09:32), Max: 36.9 (17 Mar 2020 21:59)    T(F): 97.9 (18 Mar 2020 09:32), Max: 98.4 (17 Mar 2020 21:59)    HR: 90 (18 Mar 2020 09:32) (61 - 94)    BP: 134/91 (18 Mar 2020 09:32) (114/74 - 147/93)    BP(mean): --    RR: 20 (18 Mar 2020 09:32) (15 - 20)    SpO2: 100% (18 Mar 2020 09:32) (96% - 100%)        PHYSICAL EXAM.        GEN - appears age appropriate. well nourished. pleasant. no distress.     HEENT - NCAT, EOMI, BIJU    RESP - CTA BL, no wheeze/stridor/rhonchi/crackles. not on supplemental O2. able to speak in full sentences without distress.     CARDIO - NS1S2, RRR. No murmurs/rubs/gallops.    ABD - Soft/Non tender/Non distended. Normal BS x4 quadrants. no guarding/rebound tenderness.    Ext - No MELISSA. RT radial band noted    MSK - BL 5/5 strength on upper and lower extremities.     Neuro - AAOx3. no gross neuro deficits noted    Psych - normal affect    Skin - c/d/i. no rashes/lesions

## 2020-03-18 ENCOUNTER — TRANSCRIPTION ENCOUNTER (OUTPATIENT)
Age: 60
End: 2020-03-18

## 2020-03-18 VITALS
HEART RATE: 79 BPM | RESPIRATION RATE: 20 BRPM | DIASTOLIC BLOOD PRESSURE: 85 MMHG | OXYGEN SATURATION: 96 % | SYSTOLIC BLOOD PRESSURE: 128 MMHG | TEMPERATURE: 98 F

## 2020-03-18 DIAGNOSIS — I25.118 ATHEROSCLEROTIC HEART DISEASE OF NATIVE CORONARY ARTERY WITH OTHER FORMS OF ANGINA PECTORIS: ICD-10-CM

## 2020-03-18 LAB
ANION GAP SERPL CALC-SCNC: 14 MMOL/L — SIGNIFICANT CHANGE UP (ref 5–17)
BUN SERPL-MCNC: 12 MG/DL — SIGNIFICANT CHANGE UP (ref 8–20)
CALCIUM SERPL-MCNC: 9.4 MG/DL — SIGNIFICANT CHANGE UP (ref 8.6–10.2)
CHLORIDE SERPL-SCNC: 104 MMOL/L — SIGNIFICANT CHANGE UP (ref 98–107)
CO2 SERPL-SCNC: 23 MMOL/L — SIGNIFICANT CHANGE UP (ref 22–29)
CREAT SERPL-MCNC: 0.55 MG/DL — SIGNIFICANT CHANGE UP (ref 0.5–1.3)
GLUCOSE SERPL-MCNC: 100 MG/DL — HIGH (ref 70–99)
HCT VFR BLD CALC: 40.2 % — SIGNIFICANT CHANGE UP (ref 34.5–45)
HGB BLD-MCNC: 12.9 G/DL — SIGNIFICANT CHANGE UP (ref 11.5–15.5)
MAGNESIUM SERPL-MCNC: 1.8 MG/DL — SIGNIFICANT CHANGE UP (ref 1.8–2.6)
MCHC RBC-ENTMCNC: 30.8 PG — SIGNIFICANT CHANGE UP (ref 27–34)
MCHC RBC-ENTMCNC: 32.1 GM/DL — SIGNIFICANT CHANGE UP (ref 32–36)
MCV RBC AUTO: 95.9 FL — SIGNIFICANT CHANGE UP (ref 80–100)
PHOSPHATE SERPL-MCNC: 3.2 MG/DL — SIGNIFICANT CHANGE UP (ref 2.4–4.7)
PLATELET # BLD AUTO: 351 K/UL — SIGNIFICANT CHANGE UP (ref 150–400)
POTASSIUM SERPL-MCNC: 4 MMOL/L — SIGNIFICANT CHANGE UP (ref 3.5–5.3)
POTASSIUM SERPL-SCNC: 4 MMOL/L — SIGNIFICANT CHANGE UP (ref 3.5–5.3)
RBC # BLD: 4.19 M/UL — SIGNIFICANT CHANGE UP (ref 3.8–5.2)
RBC # FLD: 13.2 % — SIGNIFICANT CHANGE UP (ref 10.3–14.5)
SODIUM SERPL-SCNC: 141 MMOL/L — SIGNIFICANT CHANGE UP (ref 135–145)
WBC # BLD: 10.74 K/UL — HIGH (ref 3.8–10.5)
WBC # FLD AUTO: 10.74 K/UL — HIGH (ref 3.8–10.5)

## 2020-03-18 PROCEDURE — 84100 ASSAY OF PHOSPHORUS: CPT

## 2020-03-18 PROCEDURE — 99231 SBSQ HOSP IP/OBS SF/LOW 25: CPT

## 2020-03-18 PROCEDURE — 70551 MRI BRAIN STEM W/O DYE: CPT

## 2020-03-18 PROCEDURE — 84484 ASSAY OF TROPONIN QUANT: CPT

## 2020-03-18 PROCEDURE — 82550 ASSAY OF CK (CPK): CPT

## 2020-03-18 PROCEDURE — 86803 HEPATITIS C AB TEST: CPT

## 2020-03-18 PROCEDURE — 99239 HOSP IP/OBS DSCHRG MGMT >30: CPT

## 2020-03-18 PROCEDURE — 83735 ASSAY OF MAGNESIUM: CPT

## 2020-03-18 PROCEDURE — 99153 MOD SED SAME PHYS/QHP EA: CPT

## 2020-03-18 PROCEDURE — G0378: CPT

## 2020-03-18 PROCEDURE — 85027 COMPLETE CBC AUTOMATED: CPT

## 2020-03-18 PROCEDURE — 93351 STRESS TTE COMPLETE: CPT

## 2020-03-18 PROCEDURE — C9600: CPT | Mod: RC

## 2020-03-18 PROCEDURE — C1887: CPT

## 2020-03-18 PROCEDURE — 83880 ASSAY OF NATRIURETIC PEPTIDE: CPT

## 2020-03-18 PROCEDURE — 71045 X-RAY EXAM CHEST 1 VIEW: CPT

## 2020-03-18 PROCEDURE — C1769: CPT

## 2020-03-18 PROCEDURE — 81001 URINALYSIS AUTO W/SCOPE: CPT

## 2020-03-18 PROCEDURE — 93458 L HRT ARTERY/VENTRICLE ANGIO: CPT | Mod: 59

## 2020-03-18 PROCEDURE — 93005 ELECTROCARDIOGRAM TRACING: CPT

## 2020-03-18 PROCEDURE — 80061 LIPID PANEL: CPT

## 2020-03-18 PROCEDURE — 83036 HEMOGLOBIN GLYCOSYLATED A1C: CPT

## 2020-03-18 PROCEDURE — 36415 COLL VENOUS BLD VENIPUNCTURE: CPT

## 2020-03-18 PROCEDURE — 75571 CT HRT W/O DYE W/CA TEST: CPT

## 2020-03-18 PROCEDURE — 99152 MOD SED SAME PHYS/QHP 5/>YRS: CPT

## 2020-03-18 PROCEDURE — 96374 THER/PROPH/DIAG INJ IV PUSH: CPT

## 2020-03-18 PROCEDURE — 83690 ASSAY OF LIPASE: CPT

## 2020-03-18 PROCEDURE — 80048 BASIC METABOLIC PNL TOTAL CA: CPT

## 2020-03-18 PROCEDURE — C1874: CPT

## 2020-03-18 PROCEDURE — 80053 COMPREHEN METABOLIC PANEL: CPT

## 2020-03-18 PROCEDURE — C1725: CPT

## 2020-03-18 PROCEDURE — C1894: CPT

## 2020-03-18 PROCEDURE — 99285 EMERGENCY DEPT VISIT HI MDM: CPT | Mod: 25

## 2020-03-18 PROCEDURE — 85379 FIBRIN DEGRADATION QUANT: CPT

## 2020-03-18 PROCEDURE — 70450 CT HEAD/BRAIN W/O DYE: CPT

## 2020-03-18 PROCEDURE — C1753: CPT

## 2020-03-18 RX ADMIN — Medication 81 MILLIGRAM(S): at 08:12

## 2020-03-18 RX ADMIN — Medication 12.5 MILLIGRAM(S): at 05:06

## 2020-03-18 RX ADMIN — TICAGRELOR 90 MILLIGRAM(S): 90 TABLET ORAL at 11:09

## 2020-03-18 RX ADMIN — SODIUM CHLORIDE 3 MILLILITER(S): 9 INJECTION INTRAMUSCULAR; INTRAVENOUS; SUBCUTANEOUS at 05:04

## 2020-03-18 NOTE — PROGRESS NOTE ADULT - ATTENDING COMMENTS
abnormal stress echo.   elevated coronary calcium score.  RCA and Left cx ischemia.  cath shows severe RCA stenosis. s/p PCI to RCa.  dual antiplatelet therapy ,. high intenisty statins  discharge plan tomorrow.
unstable angina. s/p PCI to RCA.    right arm pain.  ct dual antiplatelet therapy statins.   No further in-patient cardiac work-up/management is needed.  Follow-up in cardiology office in 3-4 weeks.

## 2020-03-18 NOTE — DISCHARGE NOTE NURSING/CASE MANAGEMENT/SOCIAL WORK - PATIENT PORTAL LINK FT
You can access the FollowMyHealth Patient Portal offered by [facility] by registering at the following website: [email or web address]. By joining [facility]’s FollowMyHealth portal, you will also be able to view your health information using other applications (apps) compatible with our system.

## 2020-03-18 NOTE — DISCHARGE NOTE NURSING/CASE MANAGEMENT/SOCIAL WORK - NSDCPEWEB_GEN_ALL_CORE
[place] website --- [email or web address] for Tobacco [email or web address] website --- [email or web address]

## 2020-03-18 NOTE — PROGRESS NOTE ADULT - REASON FOR ADMISSION
Chest Pain  Positive stress test

## 2020-03-18 NOTE — PROGRESS NOTE ADULT - SUBJECTIVE AND OBJECTIVE BOX
SUBJECTIVE:  Cardiology NP F/U note:  SP: [facility] which revealed: JENNIFER X1 to RCA offical report pending   denies complaints of chest pain/sob/dizziness/palps overnight   neri diet / ambulating     	  MEDICATIONS  (STANDING):  aspirin  chewable 81 milliGRAM(s) Oral daily  atorvastatin 40 milliGRAM(s) Oral at bedtime  metoprolol tartrate 12.5 milliGRAM(s) Oral two times a day  nicotine - 21 mG/24Hr(s) Patch 1 patch Transdermal daily  sodium chloride 0.9% lock flush 3 milliLiter(s) IV Push every 8 hours  ticagrelor 90 milliGRAM(s) Oral every 12 hours    MEDICATIONS  (PRN):  ondansetron Injectable 4 milliGRAM(s) IV Push every 6 hours PRN Nausea      PHYSICAL EXAM:    T(C): 36.7 (20 @ 04:57), Max: 36.9 (20 @ 21:59)  HR: 91 (20 @ 04:57) (61 - 94)  BP: 147/93 (20 @ 04:57) (114/74 - 147/93)  RR: 16 (20 @ 04:57) (15 - 20)  SpO2: 99% (20 @ 04:57) (96% - 100%)  Wt(kg): --    I&O's Summary    17 Mar 2020 07:01  -  18 Mar 2020 07:00  --------------------------------------------------------  IN: 480 mL / OUT: 0 mL / NET: 480 mL        Daily     Daily Weight in k.3 (18 Mar 2020 04:22)    Appearance: NAD	  Cardiovascular: Normal S1 S2 RRR 82 , No JVD, No murmurs, No edema  Respiratory: Lungs clear to auscultation	  Psychiatry: A & O x 3, Mood & affect appropriate  Gastrointestinal:  Soft, Non-tender, + BS	  Skin: warm and dry  Neurologic: Non-focal  Extremities: Normal range of motion,:  Right Radial no hematoma / good pulse / dressing removed  Vascular: Peripheral pulses palpable 2+ bilaterally    TELEMETRY: 	RSR 80's no events noted on tele    ECG:  	RSR 65/ normal record  RADIOLOGY:   DIAGNOSTIC TESTING:  [ ] Echocardiogram:  [ ]  Catheterization: Offical pending / JENNIFER x1 to RCA  	    < from: Stress Echocardiogram (20 @ 16:56) >  Summary:   1. ABNORMAL STUDY   2. The patient's functional capacity was average. 9 METs. 102% MPHR.   3. No cardiac symptoms. No ischemic ECG changes.      Null treadmill score = 7.   4. Positive exercise stress echo for ischemia suggesting left circumflex and right coronary artery diseas                                  12.9   10.74 )-----------( 351      ( 18 Mar 2020 06:01 )             40.2     03-18    141  |  104  |  12.0  ----------------------------<  100<H>  4.0   |  23.0  |  0.55    Ca    9.4      18 Mar 2020 06:01  Phos  3.2     03-18  Mg     1.8     03-18    TPro  7.0  /  Alb  4.5  /  TBili  0.3<L>  /  DBili  x   /  AST  16  /  ALT  10  /  AlkPhos  80  03-16    proBNP:   Lipid Profile:   HgA1c:   TSH:     ASSESSMENT:  59F presents with SSCP x 1 week, + Ca score and stress echo for [facility]  HX TIA/ +smoker/  LHC 3/17/20 :m JENNIFER X 1 to RCA  hemodynamically stable overnight no cp / arrhythmias    Plan:  continue present meds ASA/ Brillinta/ statin / BB   med compliance/ radial care/ follow up 7-10 days with Dr. Larkin/ and smoking cessation discussed  cardiac rehab info provided/referral and communication to cardiac rehab completed  d/c home today

## 2020-03-18 NOTE — DISCHARGE NOTE NURSING/CASE MANAGEMENT/SOCIAL WORK - NSDCPEEMAIL_GEN_ALL_CORE
[facility] for Tobacco Control email [email or web address]

## 2020-03-23 DIAGNOSIS — I63.9 CEREBRAL INFARCTION, UNSPECIFIED: ICD-10-CM

## 2020-03-23 RX ORDER — KRILL/OM-3/DHA/EPA/PHOSPHO/AST 1000-230MG
81 CAPSULE ORAL
Refills: 0 | Status: ACTIVE | COMMUNITY
Start: 2020-03-23

## 2020-03-23 RX ORDER — NICOTINE 14 MG/24H
14 PATCH, EXTENDED RELEASE TRANSDERMAL DAILY
Refills: 0 | Status: ACTIVE | COMMUNITY
Start: 2020-03-23

## 2020-04-20 PROBLEM — G45.9 TRANSIENT CEREBRAL ISCHEMIC ATTACK, UNSPECIFIED: Chronic | Status: ACTIVE | Noted: 2020-03-16

## 2020-04-24 ENCOUNTER — APPOINTMENT (OUTPATIENT)
Dept: CARDIOLOGY | Facility: CLINIC | Age: 60
End: 2020-04-24
Payer: COMMERCIAL

## 2020-04-24 PROCEDURE — 99214 OFFICE O/P EST MOD 30 MIN: CPT | Mod: 95

## 2020-04-24 NOTE — ASSESSMENT
[FreeTextEntry1] : 58 y/o female with recent unstable angina s/p JENNIFER to mid RCA \par Currently asymptomatic \par Compliant with all her meds \par continue Ticagrelor 90 mg twice daily for 6 months ( from PCI ) \par

## 2020-04-24 NOTE — HISTORY OF PRESENT ILLNESS
[Home] : at home, [unfilled] , at the time of the visit. [Medical Office: ([facility])___] : at the medical office located in  [Patient] : the patient [FreeTextEntry1] : A 78 y/o female with unstable angina , cath showed mid RCA s/p JENNIFER , non obstructive disease in LAD \par currently no chest pain  or SOB \par Feels fatigued \par compliant with her medications ASA 81 mg , Ticagrelor 90 mg BID , metoprolol , atorvastatin .  [FreeTextEntry4] : Jesus Zarate

## 2020-07-17 ENCOUNTER — APPOINTMENT (OUTPATIENT)
Dept: GYNECOLOGIC ONCOLOGY | Facility: CLINIC | Age: 60
End: 2020-07-17

## 2020-11-04 RX ORDER — ATORVASTATIN CALCIUM 40 MG/1
40 TABLET, FILM COATED ORAL
Qty: 90 | Refills: 1 | Status: ACTIVE | COMMUNITY
Start: 2020-03-23 | End: 1900-01-01

## 2020-12-03 RX ORDER — METOPROLOL TARTRATE 25 MG/1
25 TABLET, FILM COATED ORAL TWICE DAILY
Qty: 90 | Refills: 1 | Status: ACTIVE | COMMUNITY
Start: 2020-03-23 | End: 1900-01-01

## 2020-12-30 RX ORDER — TICAGRELOR 90 MG/1
90 TABLET ORAL
Qty: 180 | Refills: 2 | Status: ACTIVE | COMMUNITY
Start: 2020-03-23 | End: 1900-01-01

## 2021-01-13 ENCOUNTER — APPOINTMENT (OUTPATIENT)
Dept: CARDIOLOGY | Facility: CLINIC | Age: 61
End: 2021-01-13
Payer: COMMERCIAL

## 2021-01-13 ENCOUNTER — NON-APPOINTMENT (OUTPATIENT)
Age: 61
End: 2021-01-13

## 2021-01-13 VITALS
TEMPERATURE: 97.7 F | OXYGEN SATURATION: 97 % | HEIGHT: 60 IN | DIASTOLIC BLOOD PRESSURE: 89 MMHG | BODY MASS INDEX: 23.36 KG/M2 | WEIGHT: 119 LBS | SYSTOLIC BLOOD PRESSURE: 149 MMHG | HEART RATE: 83 BPM | RESPIRATION RATE: 16 BRPM

## 2021-01-13 DIAGNOSIS — I25.10 ATHEROSCLEROTIC HEART DISEASE OF NATIVE CORONARY ARTERY W/OUT ANGINA PECTORIS: ICD-10-CM

## 2021-01-13 PROCEDURE — 99072 ADDL SUPL MATRL&STAF TM PHE: CPT

## 2021-01-13 PROCEDURE — 99213 OFFICE O/P EST LOW 20 MIN: CPT

## 2021-01-13 PROCEDURE — 93000 ELECTROCARDIOGRAM COMPLETE: CPT

## 2021-01-13 NOTE — HISTORY OF PRESENT ILLNESS
[FreeTextEntry1] : 61 y/o female with PMhx of CAD s/p PCI to mid RCA in setting of ACS ( non obstructive disease in the LAD) \par currently no exertional chest pain or  SOB, palpitations,or syncope\par occasional dizziness when she gets up too fast \par Still smokes 5 Cig /day \par  smokes as well \par compliant to meds. \par 5 years had TIA

## 2021-01-13 NOTE — ASSESSMENT
[FreeTextEntry1] : CAD s/p mid RCA stent in 3/2020 , continue ASA 81 , metoprolol , can d/c brillinta in 3/2021\par on atorvastatin 40 mg , check lipid profile \par needs to stop smoking, advised her to quit and preferrably her  too \par follow up in 6 months \par obtain carotid U/S due to history of TIA.

## 2021-01-13 NOTE — PHYSICAL EXAM
[General Appearance - Well Developed] : well developed [Normal Appearance] : normal appearance [Well Groomed] : well groomed [General Appearance - Well Nourished] : well nourished [Normal Conjunctiva] : the conjunctiva exhibited no abnormalities [Normal Jugular Venous A Waves Present] : normal jugular venous A waves present [Normal Jugular Venous V Waves Present] : normal jugular venous V waves present [] : no respiratory distress [Auscultation Breath Sounds / Voice Sounds] : lungs were clear to auscultation bilaterally [Heart Rate And Rhythm] : heart rate and rhythm were normal [Heart Sounds] : normal S1 and S2 [Arterial Pulses Normal] : the arterial pulses were normal [Edema] : no peripheral edema present [Abnormal Walk] : normal gait [Skin Color & Pigmentation] : normal skin color and pigmentation [Skin Turgor] : normal skin turgor [Oriented To Time, Place, And Person] : oriented to person, place, and time [FreeTextEntry1] : wears mask

## 2021-02-12 ENCOUNTER — APPOINTMENT (OUTPATIENT)
Dept: CARDIOLOGY | Facility: CLINIC | Age: 61
End: 2021-02-12
Payer: COMMERCIAL

## 2021-02-12 PROCEDURE — 99072 ADDL SUPL MATRL&STAF TM PHE: CPT

## 2021-02-12 PROCEDURE — 93880 EXTRACRANIAL BILAT STUDY: CPT

## 2021-07-14 ENCOUNTER — APPOINTMENT (OUTPATIENT)
Dept: CARDIOLOGY | Facility: CLINIC | Age: 61
End: 2021-07-14

## 2024-08-16 NOTE — ED ADULT NURSE NOTE - PMH
Writer spoke with patient to provide arrival time and instructions for first lumbar medial branch test block.     Patient had multiple questions for provider on procedure differences, what to expect, sedation and would like phone call prior to procedure. Patient verbalized understanding that a message would be sent to office for review.        TIA (transient ischemic attack)